# Patient Record
Sex: MALE | Race: WHITE | NOT HISPANIC OR LATINO | URBAN - METROPOLITAN AREA
[De-identification: names, ages, dates, MRNs, and addresses within clinical notes are randomized per-mention and may not be internally consistent; named-entity substitution may affect disease eponyms.]

---

## 2018-07-22 ENCOUNTER — EMERGENCY (EMERGENCY)
Facility: HOSPITAL | Age: 38
LOS: 1 days | Discharge: HOME | End: 2018-07-22
Attending: EMERGENCY MEDICINE
Payer: COMMERCIAL

## 2018-07-22 VITALS
TEMPERATURE: 99 F | DIASTOLIC BLOOD PRESSURE: 78 MMHG | OXYGEN SATURATION: 99 % | SYSTOLIC BLOOD PRESSURE: 127 MMHG | RESPIRATION RATE: 18 BRPM | HEART RATE: 91 BPM

## 2018-07-22 VITALS — HEIGHT: 68 IN | WEIGHT: 199.96 LBS

## 2018-07-22 PROCEDURE — 99283 EMERGENCY DEPT VISIT LOW MDM: CPT | Mod: 25

## 2018-07-22 PROCEDURE — 29125 APPL SHORT ARM SPLINT STATIC: CPT

## 2018-07-22 PROCEDURE — 73130 X-RAY EXAM OF HAND: CPT

## 2018-07-22 PROCEDURE — 29125 APPL SHORT ARM SPLINT STATIC: CPT | Mod: RT

## 2018-07-22 PROCEDURE — 73130 X-RAY EXAM OF HAND: CPT | Mod: 26,RT

## 2018-07-22 PROCEDURE — 99284 EMERGENCY DEPT VISIT MOD MDM: CPT | Mod: 25
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
## 2018-07-22 NOTE — ED PROVIDER NOTE - MEDICAL DECISION MAKING DETAILS
Patient presents with deformity of right hand. Patient will receive xray of right hand and ortho consult if necessary. Patient presents with deformity of right hand. Patient will receive x-ray of right hand and referral if necessary
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
## 2022-08-27 PROBLEM — Z78.9 OTHER SPECIFIED HEALTH STATUS: Chronic | Status: ACTIVE | Noted: 2018-07-22

## 2022-08-28 ENCOUNTER — FORM ENCOUNTER (OUTPATIENT)
Age: 42
End: 2022-08-28

## 2022-08-29 ENCOUNTER — APPOINTMENT (OUTPATIENT)
Dept: ORTHOPEDIC SURGERY | Facility: CLINIC | Age: 42
End: 2022-08-29

## 2022-08-29 ENCOUNTER — APPOINTMENT (OUTPATIENT)
Dept: MRI IMAGING | Facility: CLINIC | Age: 42
End: 2022-08-29

## 2022-08-29 VITALS — HEIGHT: 68 IN

## 2022-08-29 DIAGNOSIS — M25.522 PAIN IN LEFT ELBOW: ICD-10-CM

## 2022-08-29 DIAGNOSIS — Z78.9 OTHER SPECIFIED HEALTH STATUS: ICD-10-CM

## 2022-08-29 PROBLEM — Z00.00 ENCOUNTER FOR PREVENTIVE HEALTH EXAMINATION: Status: ACTIVE | Noted: 2022-08-29

## 2022-08-29 PROCEDURE — 73221 MRI JOINT UPR EXTREM W/O DYE: CPT | Mod: LT

## 2022-08-29 PROCEDURE — 99072 ADDL SUPL MATRL&STAF TM PHE: CPT

## 2022-08-29 PROCEDURE — 99204 OFFICE O/P NEW MOD 45 MIN: CPT

## 2022-08-29 NOTE — PHYSICAL EXAM
[Left] : left elbow [Pain with Extension] : pain with extension [Pain with Supination] : pain with supination [4___] : supination 4[unfilled]/5 [de-identified] : Constitutional: The general appearance of the patient is well developed, well nourished, no deformities and well groomed. Normal \par \par Gait: Gait and function is as follows: normal gait. \par \par Skin: Head and neck visualized skin is normal. Left upper extremity visualized skin is normal. Right upper extremity visualized skin is normal. Thoracic Skin of the thoracic spine shows visualized skin is normal. \par \par Cardiovascular: palpable radial pulse bilaterally, good capillary refill in digits of the bilateral upper extremities and no temperature or color changes in the bilateral upper extremities. \par \par Lymphatic: Normal Palpation of lymph nodes in the cervical. \par \par Neurologic: fine motor control in the bilateral upper extremities is intact. Deep Tendon Reflexes in Upper and Lower Extremities Negative Flores's in the bilateral upper extremities. The patient is oriented to time, place and person. Sensation to light touch intact in the bilateral upper extremities. Mood and Affect is normal. \par \par Right Shoulder:  Inspection of the shoulder/upper arm is as follows: There is a full, pain-free, stable range of motion of the shoulder with normal strength and no tenderness to palpation. \par \par Left Shoulder:  Inspection of the shoulder/upper arm is as follows: There is a full, pain-free, stable range of motion of the shoulder with normal strength and no tenderness to palpation. \par \par Neck: \par Inspection / Palpation of the cervical spine is as follows: mild paracervical tenderness. Range of motion of the cervical spine is as follows: moderately decreased range of motion of the cervical spine. Stability testing for the cervical spine is as follows Stable range of motion. \par \par Back, including spine: Inspection / Palpation of the thoracic/lumbar spine is as follows: There is a full, pain free, stable range of motion of the thoracic spine with a normal tone and not tenderness to palpation..\par  [] : no erythema
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
## 2022-08-29 NOTE — WORK
[Torn Ligament/Tendon/Muscle] : torn ligament, tendon or muscle [Was the competent medical cause of the injury] : was the competent medical cause of the injury [Are consistent with the injury] : are consistent with the injury [Total] : total [FreeTextEntry1] : fair
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
## 2022-08-30 ENCOUNTER — APPOINTMENT (OUTPATIENT)
Dept: ORTHOPEDIC SURGERY | Facility: CLINIC | Age: 42
End: 2022-08-30

## 2022-08-30 VITALS — HEIGHT: 68 IN | BODY MASS INDEX: 33.34 KG/M2 | WEIGHT: 220 LBS

## 2022-08-30 DIAGNOSIS — Z78.9 OTHER SPECIFIED HEALTH STATUS: ICD-10-CM

## 2022-08-30 PROCEDURE — 99072 ADDL SUPL MATRL&STAF TM PHE: CPT

## 2022-08-30 PROCEDURE — 99214 OFFICE O/P EST MOD 30 MIN: CPT | Mod: 57

## 2022-08-30 NOTE — ASSESSMENT
[FreeTextEntry1] : Left distal biceps rupture - reviewed MRI with patient and discussed pathoanatomy. Reviewed nonoperative and operative management with patient. Explained that without a repair he would experience decrease supination strength. We discussed that operative care would limit weight bearing/lifting for 2-3 month post-op, but that ROM would be permitted. Patient was interested in operative management. Risks, benefits and alternatives were discussed including risk of damage to local structures in the antecubital fossa, paresthesias along lateral forearm, bony injury/fracture, re-rupture, pain, stiffness, weakness and complications associated with anesthesia. Patient understood this discussion, questions were answered and patient elected to proceed with surgery.\par \par Plan for left distal biceps repair under general. [facility].\par \par F/u 10 days after surgery
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
## 2022-08-30 NOTE — IMAGING
[de-identified] : Left elbow with mild swelling, skin intact, ecchymosis anteriorly. +ttp along biceps tendon. Pain with light resisted supination. Bicep tendon fibers palpated with lateral hook. Full arc of elbow from 0 to 135 and 80/80 pronation/supination. Able to make fist, oppose thumb to small finger and abduct fingers. Sensation intact throughout. <2sec cap refill.\par \par Left elbow MRI with evidence of complete distal biceps rupture.

## 2022-09-01 ENCOUNTER — APPOINTMENT (OUTPATIENT)
Dept: ORTHOPEDIC SURGERY | Facility: AMBULATORY SURGERY CENTER | Age: 42
End: 2022-09-01

## 2022-09-01 PROCEDURE — 24342 REPAIR OF RUPTURED TENDON: CPT | Mod: LT

## 2022-09-01 RX ORDER — OXYCODONE 5 MG/1
5 TABLET ORAL EVERY 6 HOURS
Qty: 20 | Refills: 0 | Status: ACTIVE | COMMUNITY
Start: 2022-09-01 | End: 1900-01-01

## 2022-09-13 ENCOUNTER — APPOINTMENT (OUTPATIENT)
Dept: ORTHOPEDIC SURGERY | Facility: CLINIC | Age: 42
End: 2022-09-13

## 2022-09-13 PROCEDURE — 99024 POSTOP FOLLOW-UP VISIT: CPT
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
## 2022-09-15 NOTE — IMAGING
[de-identified] : Left elbow with mild swelling. Incision well healed, no erythema nor drainage. Able to make fist, oppose thumb to small finger and abduct fingers. Sensation intact throughout. <2sec cap refill.\par \par Left elbow MRI with evidence of complete distal biceps rupture.

## 2022-09-15 NOTE — ASSESSMENT
[FreeTextEntry1] : s/p left distal biceps repair [9/1/22] - progressing well postoperatively. OT for ROM. NWB through 10 weeks postop.\par \par F/u 6 weeks

## 2022-09-27 ENCOUNTER — FORM ENCOUNTER (OUTPATIENT)
Age: 42
End: 2022-09-27

## 2022-09-28 RX ORDER — GABAPENTIN 100 MG/1
100 CAPSULE ORAL 3 TIMES DAILY
Qty: 42 | Refills: 0 | Status: ACTIVE | COMMUNITY
Start: 2022-09-28 | End: 1900-01-01

## 2022-10-18 ENCOUNTER — APPOINTMENT (OUTPATIENT)
Dept: ORTHOPEDIC SURGERY | Facility: CLINIC | Age: 42
End: 2022-10-18

## 2022-10-18 DIAGNOSIS — G56.22 LESION OF ULNAR NERVE, LEFT UPPER LIMB: ICD-10-CM

## 2022-10-18 PROCEDURE — 99024 POSTOP FOLLOW-UP VISIT: CPT
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
## 2022-10-18 NOTE — IMAGING
[de-identified] : Left elbow with mild swelling. Incision well healed, no erythema nor drainage. Able to make fist, oppose thumb to small finger and abduct fingers. Sensation intact throughout. <2sec cap refill. -tinel at cubital, carpal and Guyon. Decreased sensation at very tip of thumb, intact in lateral antebrachial cutaneous, superficial radial and ulnar. +Froment's.\par \par Left elbow MRI with evidence of complete distal biceps rupture.

## 2022-10-18 NOTE — ASSESSMENT
[FreeTextEntry1] : s/p left distal biceps repair [9/1/22] - progressing well postoperatively. OT for ROM. NWB through 10 weeks postop.\par \par Left CTS/cubital syndrome - reviewed pathoanatomy. Encouraged nighttime cockup wrist bracing and elbow extension bracing. Will obtain LUE EMG/NCV in light of severity of symptoms - patient will follow-up thereafter to discuss results and develop plan-of-care.\par \par F/u after EMG/NCV

## 2022-10-24 ENCOUNTER — APPOINTMENT (OUTPATIENT)
Dept: NEUROLOGY | Facility: CLINIC | Age: 42
End: 2022-10-24

## 2022-10-24 DIAGNOSIS — M79.602 PAIN IN LEFT ARM: ICD-10-CM

## 2022-10-24 DIAGNOSIS — R20.0 ANESTHESIA OF SKIN: ICD-10-CM

## 2022-10-24 DIAGNOSIS — R20.2 ANESTHESIA OF SKIN: ICD-10-CM

## 2022-10-24 PROCEDURE — 95886 MUSC TEST DONE W/N TEST COMP: CPT

## 2022-10-24 PROCEDURE — 99072 ADDL SUPL MATRL&STAF TM PHE: CPT

## 2022-10-24 PROCEDURE — 95911 NRV CNDJ TEST 9-10 STUDIES: CPT

## 2022-10-25 ENCOUNTER — APPOINTMENT (OUTPATIENT)
Dept: ORTHOPEDIC SURGERY | Facility: CLINIC | Age: 42
End: 2022-10-25

## 2022-10-25 DIAGNOSIS — M54.12 RADICULOPATHY, CERVICAL REGION: ICD-10-CM

## 2022-10-25 PROCEDURE — 99214 OFFICE O/P EST MOD 30 MIN: CPT | Mod: 24

## 2022-10-25 PROCEDURE — 99072 ADDL SUPL MATRL&STAF TM PHE: CPT

## 2022-10-26 PROBLEM — M54.12 CERVICAL RADICULOPATHY: Status: ACTIVE | Noted: 2022-10-24
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
## 2022-10-26 NOTE — IMAGING
[de-identified] : Left elbow with mild swelling. Incision well healed, no erythema nor drainage. Able to make fist, oppose thumb to small finger and abduct fingers. Sensation intact throughout. <2sec cap refill. -tinel at cubital, carpal and Guyon. Decreased sensation at very tip of thumb, intact in lateral antebrachial cutaneous, superficial radial and ulnar. +Froment's.\par \par Left elbow MRI with evidence of complete distal biceps rupture.

## 2022-10-26 NOTE — ASSESSMENT
[FreeTextEntry1] : s/p left distal biceps repair [9/1/22] - progressing well postoperatively. OT for ROM. Ease into WBing and strengthening at 10 weeks postop.\par \par Left CTS/cubital syndrome - reviewed pathoanatomy. Reviewed EMG/NCV that demonstrated C7 C8>>C6 radiculopathy. Discussed there is no evidence of cubital tunnel syndrome. Radiculopathy can explain ulnar digits numbness and intrinsic weakness. Patient with history of C-spine HNP, will follow-up with spine surgeon.\par \par F/u 2-3mo

## 2022-11-01 ENCOUNTER — APPOINTMENT (OUTPATIENT)
Dept: ORTHOPEDIC SURGERY | Facility: CLINIC | Age: 42
End: 2022-11-01

## 2022-12-13 ENCOUNTER — APPOINTMENT (OUTPATIENT)
Dept: ORTHOPEDIC SURGERY | Facility: CLINIC | Age: 42
End: 2022-12-13

## 2022-12-13 DIAGNOSIS — S46.212A STRAIN OF MUSCLE, FASCIA AND TENDON OF OTHER PARTS OF BICEPS, LEFT ARM, INITIAL ENCOUNTER: ICD-10-CM

## 2022-12-13 PROCEDURE — 99214 OFFICE O/P EST MOD 30 MIN: CPT

## 2022-12-13 PROCEDURE — 99072 ADDL SUPL MATRL&STAF TM PHE: CPT

## 2022-12-25 PROBLEM — S46.212A RUPTURE OF LEFT DISTAL BICEPS TENDON, INITIAL ENCOUNTER: Status: ACTIVE | Noted: 2022-08-29
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
## 2022-12-25 NOTE — ASSESSMENT
[FreeTextEntry1] : s/p left distal biceps repair [9/1/22] - progressing well postoperatively. OT for ROM.WBAT.\par \par Patient with 10% scheduled loss of use of left elbow as with residual loss of strength and persistent numbness into digits. Patient has full arc of motion.\par \par \par \par Left CTS/cubital syndrome - reviewed pathoanatomy. Reviewed EMG/NCV that demonstrated C7 C8>>C6 radiculopathy. Discussed there is no evidence of cubital tunnel syndrome. Radiculopathy can explain ulnar digits numbness and intrinsic weakness. Patient with history of C-spine HNP, will follow-up with spine surgeon.\par \par F/u 4mo/prn

## 2022-12-25 NOTE — IMAGING
[de-identified] : Left elbow with mild swelling. Incision well healed, no erythema nor drainage. Able to make fist, oppose thumb to small finger and abduct fingers. Sensation intact throughout. <2sec cap refill. -tinel at cubital, carpal and Guyon. Decreased sensation at very tip of thumb, intact in lateral antebrachial cutaneous, superficial radial and ulnar. +Froment's.\par \par Left elbow MRI with evidence of complete distal biceps rupture.

## 2024-10-04 ENCOUNTER — TRANSCRIPTION ENCOUNTER (OUTPATIENT)
Age: 44
End: 2024-10-04